# Patient Record
Sex: FEMALE | Race: WHITE | NOT HISPANIC OR LATINO | Employment: STUDENT | URBAN - METROPOLITAN AREA
[De-identification: names, ages, dates, MRNs, and addresses within clinical notes are randomized per-mention and may not be internally consistent; named-entity substitution may affect disease eponyms.]

---
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
## 2025-07-13 ENCOUNTER — HOSPITAL ENCOUNTER (OUTPATIENT)
Facility: HOSPITAL | Age: 20
Setting detail: OBSERVATION
Discharge: HOME | End: 2025-07-14
Attending: EMERGENCY MEDICINE | Admitting: EMERGENCY MEDICINE
Payer: COMMERCIAL

## 2025-07-13 DIAGNOSIS — R11.2 NAUSEA AND VOMITING, UNSPECIFIED VOMITING TYPE: Primary | ICD-10-CM

## 2025-07-13 DIAGNOSIS — R10.31 RIGHT LOWER QUADRANT ABDOMINAL PAIN: ICD-10-CM

## 2025-07-13 PROBLEM — R11.10 VOMITING: Status: ACTIVE | Noted: 2025-07-13

## 2025-07-13 LAB
ANION GAP SERPL CALCULATED.3IONS-SCNC: 15.8 MMOL/L (ref 5–15)
BACTERIA UR QL AUTO: ABNORMAL /HPF
BASOPHILS # BLD AUTO: 0.07 10*3/MM3 (ref 0–0.2)
BASOPHILS NFR BLD AUTO: 0.5 % (ref 0–1.5)
BILIRUB UR QL STRIP: ABNORMAL
BUN SERPL-MCNC: 14.5 MG/DL (ref 6–20)
BUN/CREAT SERPL: 22.3 (ref 7–25)
CALCIUM SPEC-SCNC: 9.8 MG/DL (ref 8.6–10.5)
CHLORIDE SERPL-SCNC: 99 MMOL/L (ref 98–107)
CLARITY UR: ABNORMAL
CO2 SERPL-SCNC: 22.2 MMOL/L (ref 22–29)
COLOR UR: ABNORMAL
CREAT SERPL-MCNC: 0.65 MG/DL (ref 0.57–1)
DEPRECATED RDW RBC AUTO: 41.9 FL (ref 37–54)
EGFRCR SERPLBLD CKD-EPI 2021: 127.8 ML/MIN/1.73
EOSINOPHIL # BLD AUTO: 0 10*3/MM3 (ref 0–0.4)
EOSINOPHIL NFR BLD AUTO: 0 % (ref 0.3–6.2)
ERYTHROCYTE [DISTWIDTH] IN BLOOD BY AUTOMATED COUNT: 14.6 % (ref 12.3–15.4)
GLUCOSE SERPL-MCNC: 116 MG/DL (ref 65–99)
GLUCOSE UR STRIP-MCNC: NEGATIVE MG/DL
HCT VFR BLD AUTO: 45.4 % (ref 34–46.6)
HGB BLD-MCNC: 13.7 G/DL (ref 12–15.9)
HGB UR QL STRIP.AUTO: ABNORMAL
HOLD SPECIMEN: NORMAL
HYALINE CASTS UR QL AUTO: ABNORMAL /LPF
IMM GRANULOCYTES # BLD AUTO: 0.15 10*3/MM3 (ref 0–0.05)
IMM GRANULOCYTES NFR BLD AUTO: 1.1 % (ref 0–0.5)
KETONES UR QL STRIP: ABNORMAL
LEUKOCYTE ESTERASE UR QL STRIP.AUTO: ABNORMAL
LYMPHOCYTES # BLD AUTO: 2.22 10*3/MM3 (ref 0.7–3.1)
LYMPHOCYTES NFR BLD AUTO: 16.7 % (ref 19.6–45.3)
MCH RBC QN AUTO: 23.9 PG (ref 26.6–33)
MCHC RBC AUTO-ENTMCNC: 30.2 G/DL (ref 31.5–35.7)
MCV RBC AUTO: 79.2 FL (ref 79–97)
MONOCYTES # BLD AUTO: 1.13 10*3/MM3 (ref 0.1–0.9)
MONOCYTES NFR BLD AUTO: 8.5 % (ref 5–12)
MUCOUS THREADS URNS QL MICRO: ABNORMAL /HPF
NEUTROPHILS NFR BLD AUTO: 73.2 % (ref 42.7–76)
NEUTROPHILS NFR BLD AUTO: 9.7 10*3/MM3 (ref 1.7–7)
NITRITE UR QL STRIP: NEGATIVE
NRBC BLD AUTO-RTO: 0 /100 WBC (ref 0–0.2)
PH UR STRIP.AUTO: 6 [PH] (ref 5–8)
PLATELET # BLD AUTO: 583 10*3/MM3 (ref 140–450)
PMV BLD AUTO: 9.4 FL (ref 6–12)
POTASSIUM SERPL-SCNC: 4.2 MMOL/L (ref 3.5–5.2)
PROT UR QL STRIP: ABNORMAL
RBC # BLD AUTO: 5.73 10*6/MM3 (ref 3.77–5.28)
RBC # UR STRIP: ABNORMAL /HPF
REF LAB TEST METHOD: ABNORMAL
SODIUM SERPL-SCNC: 137 MMOL/L (ref 136–145)
SP GR UR STRIP: 1.03 (ref 1–1.03)
SQUAMOUS #/AREA URNS HPF: ABNORMAL /HPF
UROBILINOGEN UR QL STRIP: ABNORMAL
WBC # UR STRIP: ABNORMAL /HPF
WBC NRBC COR # BLD AUTO: 13.27 10*3/MM3 (ref 3.4–10.8)
WHOLE BLOOD HOLD COAG: NORMAL

## 2025-07-13 PROCEDURE — 25010000002 PROCHLORPERAZINE 10 MG/2ML SOLUTION: Performed by: NURSE PRACTITIONER

## 2025-07-13 PROCEDURE — 80048 BASIC METABOLIC PNL TOTAL CA: CPT | Performed by: NURSE PRACTITIONER

## 2025-07-13 PROCEDURE — 96376 TX/PRO/DX INJ SAME DRUG ADON: CPT

## 2025-07-13 PROCEDURE — 96375 TX/PRO/DX INJ NEW DRUG ADDON: CPT

## 2025-07-13 PROCEDURE — 99285 EMERGENCY DEPT VISIT HI MDM: CPT

## 2025-07-13 PROCEDURE — 25010000002 MORPHINE PER 10 MG: Performed by: EMERGENCY MEDICINE

## 2025-07-13 PROCEDURE — 96374 THER/PROPH/DIAG INJ IV PUSH: CPT

## 2025-07-13 PROCEDURE — 85025 COMPLETE CBC W/AUTO DIFF WBC: CPT | Performed by: NURSE PRACTITIONER

## 2025-07-13 PROCEDURE — G0378 HOSPITAL OBSERVATION PER HR: HCPCS

## 2025-07-13 PROCEDURE — 81001 URINALYSIS AUTO W/SCOPE: CPT | Performed by: NURSE PRACTITIONER

## 2025-07-13 PROCEDURE — 25010000002 ONDANSETRON PER 1 MG: Performed by: EMERGENCY MEDICINE

## 2025-07-13 PROCEDURE — 25010000002 ONDANSETRON PER 1 MG: Performed by: NURSE PRACTITIONER

## 2025-07-13 PROCEDURE — 25010000002 PROCHLORPERAZINE 10 MG/2ML SOLUTION: Performed by: EMERGENCY MEDICINE

## 2025-07-13 PROCEDURE — 25810000003 SODIUM CHLORIDE 0.9 % SOLUTION: Performed by: NURSE PRACTITIONER

## 2025-07-13 RX ORDER — ONDANSETRON 2 MG/ML
4 INJECTION INTRAMUSCULAR; INTRAVENOUS ONCE
Status: COMPLETED | OUTPATIENT
Start: 2025-07-13 | End: 2025-07-13

## 2025-07-13 RX ORDER — PROCHLORPERAZINE EDISYLATE 5 MG/ML
10 INJECTION INTRAMUSCULAR; INTRAVENOUS EVERY 8 HOURS PRN
Status: DISCONTINUED | OUTPATIENT
Start: 2025-07-13 | End: 2025-07-14 | Stop reason: HOSPADM

## 2025-07-13 RX ORDER — ONDANSETRON 4 MG/1
4 TABLET, ORALLY DISINTEGRATING ORAL 4 TIMES DAILY
COMMUNITY

## 2025-07-13 RX ORDER — ACETAMINOPHEN 160 MG/5ML
650 SOLUTION ORAL EVERY 4 HOURS PRN
Status: DISCONTINUED | OUTPATIENT
Start: 2025-07-13 | End: 2025-07-14 | Stop reason: HOSPADM

## 2025-07-13 RX ORDER — SODIUM CHLORIDE 9 MG/ML
40 INJECTION, SOLUTION INTRAVENOUS AS NEEDED
Status: DISCONTINUED | OUTPATIENT
Start: 2025-07-13 | End: 2025-07-14 | Stop reason: HOSPADM

## 2025-07-13 RX ORDER — BISACODYL 5 MG/1
5 TABLET, DELAYED RELEASE ORAL DAILY PRN
Status: DISCONTINUED | OUTPATIENT
Start: 2025-07-13 | End: 2025-07-14 | Stop reason: HOSPADM

## 2025-07-13 RX ORDER — PROMETHAZINE HYDROCHLORIDE 25 MG/1
25 SUPPOSITORY RECTAL EVERY 6 HOURS PRN
Status: DISCONTINUED | OUTPATIENT
Start: 2025-07-13 | End: 2025-07-14 | Stop reason: HOSPADM

## 2025-07-13 RX ORDER — ONDANSETRON 2 MG/ML
4 INJECTION INTRAMUSCULAR; INTRAVENOUS EVERY 6 HOURS PRN
Status: DISCONTINUED | OUTPATIENT
Start: 2025-07-13 | End: 2025-07-14 | Stop reason: HOSPADM

## 2025-07-13 RX ORDER — MORPHINE SULFATE 2 MG/ML
2 INJECTION, SOLUTION INTRAMUSCULAR; INTRAVENOUS EVERY 4 HOURS PRN
Status: DISCONTINUED | OUTPATIENT
Start: 2025-07-13 | End: 2025-07-14 | Stop reason: HOSPADM

## 2025-07-13 RX ORDER — AMOXICILLIN 250 MG
2 CAPSULE ORAL 2 TIMES DAILY PRN
Status: DISCONTINUED | OUTPATIENT
Start: 2025-07-13 | End: 2025-07-14 | Stop reason: HOSPADM

## 2025-07-13 RX ORDER — SODIUM CHLORIDE 0.9 % (FLUSH) 0.9 %
10 SYRINGE (ML) INJECTION AS NEEDED
Status: DISCONTINUED | OUTPATIENT
Start: 2025-07-13 | End: 2025-07-14 | Stop reason: HOSPADM

## 2025-07-13 RX ORDER — PROCHLORPERAZINE EDISYLATE 5 MG/ML
5 INJECTION INTRAMUSCULAR; INTRAVENOUS ONCE
Status: COMPLETED | OUTPATIENT
Start: 2025-07-13 | End: 2025-07-13

## 2025-07-13 RX ORDER — ACETAMINOPHEN 650 MG/1
650 SUPPOSITORY RECTAL EVERY 4 HOURS PRN
Status: DISCONTINUED | OUTPATIENT
Start: 2025-07-13 | End: 2025-07-14 | Stop reason: HOSPADM

## 2025-07-13 RX ORDER — BISACODYL 10 MG
10 SUPPOSITORY, RECTAL RECTAL DAILY PRN
Status: DISCONTINUED | OUTPATIENT
Start: 2025-07-13 | End: 2025-07-14 | Stop reason: HOSPADM

## 2025-07-13 RX ORDER — ACETAMINOPHEN 325 MG/1
650 TABLET ORAL EVERY 4 HOURS PRN
Status: DISCONTINUED | OUTPATIENT
Start: 2025-07-13 | End: 2025-07-14 | Stop reason: HOSPADM

## 2025-07-13 RX ORDER — POLYETHYLENE GLYCOL 3350 17 G/17G
17 POWDER, FOR SOLUTION ORAL DAILY PRN
Status: DISCONTINUED | OUTPATIENT
Start: 2025-07-13 | End: 2025-07-14 | Stop reason: HOSPADM

## 2025-07-13 RX ORDER — SODIUM CHLORIDE 0.9 % (FLUSH) 0.9 %
10 SYRINGE (ML) INJECTION EVERY 12 HOURS SCHEDULED
Status: DISCONTINUED | OUTPATIENT
Start: 2025-07-13 | End: 2025-07-14 | Stop reason: HOSPADM

## 2025-07-13 RX ORDER — NALOXONE HCL 0.4 MG/ML
0.4 VIAL (ML) INJECTION
Status: DISCONTINUED | OUTPATIENT
Start: 2025-07-13 | End: 2025-07-14 | Stop reason: HOSPADM

## 2025-07-13 RX ADMIN — ONDANSETRON 4 MG: 2 INJECTION, SOLUTION INTRAMUSCULAR; INTRAVENOUS at 14:23

## 2025-07-13 RX ADMIN — Medication 10 ML: at 20:55

## 2025-07-13 RX ADMIN — MORPHINE SULFATE 2 MG: 2 INJECTION, SOLUTION INTRAMUSCULAR; INTRAVENOUS at 22:42

## 2025-07-13 RX ADMIN — Medication 10 ML: at 20:54

## 2025-07-13 RX ADMIN — SODIUM CHLORIDE 1000 ML: 9 INJECTION, SOLUTION INTRAVENOUS at 14:23

## 2025-07-13 RX ADMIN — PROCHLORPERAZINE EDISYLATE 10 MG: 5 INJECTION INTRAMUSCULAR; INTRAVENOUS at 23:01

## 2025-07-13 RX ADMIN — ONDANSETRON 4 MG: 2 INJECTION, SOLUTION INTRAMUSCULAR; INTRAVENOUS at 22:42

## 2025-07-13 RX ADMIN — PROCHLORPERAZINE EDISYLATE 5 MG: 5 INJECTION INTRAMUSCULAR; INTRAVENOUS at 16:05

## 2025-07-13 NOTE — PLAN OF CARE
Goal Outcome Evaluation:      Patient admitted to room with parents at bedside. Room air, abdominal pain 1/10. C/O nausea and vomiting. Patient oriented to room and safety.

## 2025-07-13 NOTE — ED NOTES
Nursing report ED to floor  Minerva Cuevas  20 y.o.  female    HPI:   Chief Complaint   Patient presents with    Abdominal Pain     C/o RLQ abdominal pain with n/v for past 5 days. Pt was seen at [place] ED for this and dx with inflammation to abdominal lymph nodes and n/v. Pt reports continued sx.       Admitting doctor:   Chris Jean MD    Admitting diagnosis:   The primary encounter diagnosis was Nausea and vomiting, unspecified vomiting type. A diagnosis of Right lower quadrant abdominal pain was also pertinent to this visit.    Code status:   Current Code Status       Date Active Code Status Order ID Comments User Context       Not on file            Allergies:   Patient has no known allergies.    Isolation:  No active isolations     Fall Risk:  Fall Risk Assessment was completed, and patient is at low risk for falls.   Predictive Model Details         2 (Low) Factor Value    Calculated 7/13/2025 17:12 Active Peripheral IV Present    Risk of Fall Model Respiratory Rate 18     Magnesium not on file     Number of Distinct Medication Classes administered 3     Damion Scale not on file     Age 20     Chloride 99 mmol/L     Albumin not on file     Gastrointestinal Assessment X     Diastolic      Creatinine 0.65 mg/dL     Total Bilirubin not on file     Duration of Current Encounter 0.157 days     Potassium 4.2 mmol/L     Calcium 9.8 mg/dL     ALT not on file         Weight:       07/13/25  1310   Weight: 112 kg (247 lb 12.8 oz)       Intake and Output    Intake/Output Summary (Last 24 hours) at 7/13/2025 1713  Last data filed at 7/13/2025 1700  Gross per 24 hour   Intake 1000 ml   Output --   Net 1000 ml       Diet:        Most recent vitals:   Vitals:    07/13/25 1333 07/13/25 1352 07/13/25 1453 07/13/25 1701   BP: 138/89 154/81 (!) 186/86 179/100   BP Location:       Patient Position:       Pulse: 95 69 61 71   Resp:       Temp:       TempSrc:       SpO2: 97% 98% 97% 97%   Weight:       Height:            Active LDAs/IV Access:   Lines, Drains & Airways       Active LDAs       Name Placement date Placement time Site Days    Peripheral IV 07/13/25 1423 20 G Anterior;Left Forearm 07/13/25 1423  Forearm  less than 1                    Skin Condition:   Skin Assessments (last day)       None             Labs (abnormal labs have a star):   Labs Reviewed   BASIC METABOLIC PANEL - Abnormal; Notable for the following components:       Result Value    Glucose 116 (*)     Anion Gap 15.8 (*)     All other components within normal limits    Narrative:     GFR Categories in Chronic Kidney Disease (CKD)              GFR Category          GFR (mL/min/1.73)    Interpretation  G1                    90 or greater        Normal or high (1)  G2                    60-89                Mild decrease (1)  G3a                   45-59                Mild to moderate decrease  G3b                   30-44                Moderate to severe decrease  G4                    15-29                Severe decrease  G5                    14 or less           Kidney failure    (1)In the absence of evidence of kidney disease, neither GFR category G1 or G2 fulfill the criteria for CKD.    eGFR calculation 2021 CKD-EPI creatinine equation, which does not include race as a factor   URINALYSIS W/ MICROSCOPIC IF INDICATED (NO CULTURE) - Abnormal; Notable for the following components:    Color, UA Dark Yellow (*)     Appearance, UA Cloudy (*)     Specific Gravity, UA 1.032 (*)     Ketones, UA 40 mg/dL (2+) (*)     Bilirubin, UA Small (1+) (*)     Blood, UA Large (3+) (*)     Protein, UA 30 mg/dL (1+) (*)     Leuk Esterase, UA Trace (*)     All other components within normal limits   CBC WITH AUTO DIFFERENTIAL - Abnormal; Notable for the following components:    WBC 13.27 (*)     RBC 5.73 (*)     MCH 23.9 (*)     MCHC 30.2 (*)     Platelets 583 (*)     Lymphocyte % 16.7 (*)     Eosinophil % 0.0 (*)     Immature Grans % 1.1 (*)     Neutrophils, Absolute  9.70 (*)     Monocytes, Absolute 1.13 (*)     Immature Grans, Absolute 0.15 (*)     All other components within normal limits   URINALYSIS, MICROSCOPIC ONLY - Abnormal; Notable for the following components:    WBC, UA 3-5 (*)     Squamous Epithelial Cells, UA 3-6 (*)     Mucus, UA Large/3+ (*)     All other components within normal limits   CBC AND DIFFERENTIAL    Narrative:     The following orders were created for panel order CBC & Differential.  Procedure                               Abnormality         Status                     ---------                               -----------         ------                     CBC Auto Differential[[id number]]        Abnormal            Final result                 Please view results for these tests on the individual orders.   EXTRA TUBES    Narrative:     The following orders were created for panel order Extra Tubes.  Procedure                               Abnormality         Status                     ---------                               -----------         ------                     Gold Top - [facility][[id number]]                                   Final result               Light Blue Top[[id number]]                                   Final result                 Please view results for these tests on the individual orders.   GOLD [facility] - [facility]   LIGHT BLUE TOP       LOC: Person, Place, Time, and Situation    Telemetry:  Observation Unit    Cardiac Monitoring Ordered: yes    EKG:   No orders to display       Medications Given in the ED:   Medications   sodium chloride 0.9 % bolus 1,000 mL (0 mL Intravenous Stopped 7/13/25 1700)   ondansetron (ZOFRAN) injection 4 mg (4 mg Intravenous Given 7/13/25 1423)   prochlorperazine (COMPAZINE) injection 5 mg (5 mg Intravenous Given 7/13/25 1605)       Imaging results:  No radiology results for the last day    Social issues:   Social History     Socioeconomic History    Marital status: Single   Tobacco Use    Smoking status: Never     Smokeless tobacco: Never   Substance and Sexual Activity    Alcohol use: No    Drug use: No    Sexual activity: Defer       NIH Stroke Scale:  Interval: (not recorded)  1a. Level of Consciousness: (not recorded)  1b. LOC Questions: (not recorded)  1c. LOC Commands: (not recorded)  2. Best Gaze: (not recorded)  3. Visual: (not recorded)  4. Facial Palsy: (not recorded)  5a. Motor Arm, Left: (not recorded)  5b. Motor Arm, Right: (not recorded)  6a. Motor Leg, Left: (not recorded)  6b. Motor Leg, Right: (not recorded)  7. Limb Ataxia: (not recorded)  8. Sensory: (not recorded)  9. Best Language: (not recorded)  10. Dysarthria: (not recorded)  11. Extinction and Inattention (formerly Neglect): (not recorded)    Total (NIH Stroke Scale): (not recorded)     Additional notable assessment information:     Nursing report ED to floor:  REPORT GIVEN TO EMANUEL DUONG TAKEN ROOM 223    Diana Troy LPN   07/13/25 17:13 EDT

## 2025-07-13 NOTE — ED PROVIDER NOTES
Subjective   Chief Complaint   Patient presents with    Abdominal Pain     C/o RLQ abdominal pain with n/v for past 5 days. Pt was seen at [place] ED for this and dx with inflammation to abdominal lymph nodes and n/v. Pt reports continued sx.       History of Present Illness  Patient is a 20-year-old female presents the ED with complaint of generalized abdominal pain, nausea, vomiting, diarrhea.  Symptoms began about 6 days ago.  Denies a fever.  Denies any hematochezia or melena.  Reports being seen at outlying facility on 2 occasions for similar, but reports no resolution of symptoms.  Denies urinary symptoms.  No recent travel.  No ill contacts.  No suspicious foods    Review of Systems    Past Medical History:   Diagnosis Date    Allergic     Allergic rhinitis due to pollen     Asthma, moderate persistent        No Known Allergies    No past surgical history on file.    Family History   Problem Relation Age of Onset    Breast cancer Maternal Grandmother        Social History     Socioeconomic History    Marital status: Single   Tobacco Use    Smoking status: Never    Smokeless tobacco: Never   Substance and Sexual Activity    Alcohol use: No    Drug use: No    Sexual activity: Defer           Objective   Physical Exam  Vitals and nursing note reviewed.   Constitutional:       Appearance: She is well-developed. She is not toxic-appearing.   HENT:      Head: Normocephalic and atraumatic.      Mouth/Throat:      Mouth: Mucous membranes are moist.      Pharynx: Oropharynx is clear.   Eyes:      Extraocular Movements: Extraocular movements intact.      Pupils: Pupils are equal, round, and reactive to light.   Cardiovascular:      Rate and Rhythm: Normal rate and regular rhythm.   Pulmonary:      Effort: Pulmonary effort is normal.      Breath sounds: Normal breath sounds.   Abdominal:      General: Abdomen is flat. There is no distension. There are no signs of injury.      Palpations: Abdomen is soft.       Tenderness: There is no abdominal tenderness. There is no guarding or rebound.   Skin:     General: Skin is warm and dry.      Capillary Refill: Capillary refill takes less than 2 seconds.   Neurological:      General: No focal deficit present.      Mental Status: She is alert and oriented to person, place, and time.         Procedures           ED Course  ED Course as of 07/13/25 1641   Sun Jul 13, 2025   1622 Patient family updated. [LB]      ED Course User Index  [LB] Matilde Duran Freeman, ABRIL        No radiology results for the last day                                                 Medical Decision Making  Problems Addressed:  Nausea and vomiting, unspecified vomiting type: complicated acute illness or injury  Right lower quadrant abdominal pain: complicated acute illness or injury    Amount and/or Complexity of Data Reviewed  Labs: ordered.    Risk  Prescription drug management.  Decision regarding hospitalization.    Appropriate PPE worn during patient interactions.    Chart Review: CT abdomen pelvis [facility] shows mesenteric adenitis.  Diverticulosis without diverticulitis.  Hepatic cytosis    Differential diagnoses considered for patient chief complaint and presentation, this list is not all inclusive of diagnoses considered: Ureterolithiasis, pyelonephritis, zoster, mesenteric adenitis, appendicitis.  Patient had the above exam and workup.  Patient was given IV fluids, antiemetics.Leukocytosis 13.2.  No signs of infection in the urine.  BMP reviewed.  Anion gap 15.  CO2 22.2.  Patient did have further vomiting despite Zofran, she was given Compazine.  Patient had CT scan yesterday, if not for the need to repeat today.  Please patient ED observation tonight with continued antiemetics, IV fluids.  If she is not better tomorrow, may consider abdominal ultrasound or GI consult.    Assessment findings and plan of care with the patient and her family, they agree.    Note Disclaimer: At  [facility], we believe that sharing information builds trust and better relationships. You are receiving this note because you recently visited [facility]. It is possible you will see health information before a provider has talked with you about it. This kind of information can be easy to misunderstand. To help you fully understand what it means for your health, we urge you to discuss this note with your provider  Note dictated utilizing Dragon Dictation.          Final diagnoses:   Nausea and vomiting, unspecified vomiting type   Right lower quadrant abdominal pain       ED Disposition  ED Disposition       ED Disposition   Decision to Admit    Condition   --    Comment   --               No follow-up provider specified.       Medication List      No changes were made to your prescriptions during this visit.            Matilde Duran, APRN  07/13/25 [id number]

## 2025-07-14 VITALS
HEIGHT: 64 IN | DIASTOLIC BLOOD PRESSURE: 92 MMHG | WEIGHT: 247.8 LBS | OXYGEN SATURATION: 96 % | TEMPERATURE: 98.1 F | BODY MASS INDEX: 42.3 KG/M2 | SYSTOLIC BLOOD PRESSURE: 159 MMHG | HEART RATE: 87 BPM | RESPIRATION RATE: 16 BRPM

## 2025-07-14 PROBLEM — R03.0 ELEVATED BLOOD PRESSURE READING: Status: ACTIVE | Noted: 2025-07-14

## 2025-07-14 PROBLEM — I88.0 MESENTERIC ADENITIS: Status: ACTIVE | Noted: 2025-07-14

## 2025-07-14 LAB
ANION GAP SERPL CALCULATED.3IONS-SCNC: 11.8 MMOL/L (ref 5–15)
BASOPHILS # BLD AUTO: 0.04 10*3/MM3 (ref 0–0.2)
BASOPHILS NFR BLD AUTO: 0.4 % (ref 0–1.5)
BUN SERPL-MCNC: 14 MG/DL (ref 6–20)
BUN/CREAT SERPL: 25.9 (ref 7–25)
CALCIUM SPEC-SCNC: 9.1 MG/DL (ref 8.6–10.5)
CHLORIDE SERPL-SCNC: 100 MMOL/L (ref 98–107)
CHOLEST SERPL-MCNC: 90 MG/DL (ref 0–200)
CO2 SERPL-SCNC: 25.2 MMOL/L (ref 22–29)
CREAT SERPL-MCNC: 0.54 MG/DL (ref 0.57–1)
DEPRECATED RDW RBC AUTO: 42.3 FL (ref 37–54)
EGFRCR SERPLBLD CKD-EPI 2021: 133.7 ML/MIN/1.73
EOSINOPHIL # BLD AUTO: 0 10*3/MM3 (ref 0–0.4)
EOSINOPHIL NFR BLD AUTO: 0 % (ref 0.3–6.2)
ERYTHROCYTE [DISTWIDTH] IN BLOOD BY AUTOMATED COUNT: 14.9 % (ref 12.3–15.4)
GLUCOSE SERPL-MCNC: 103 MG/DL (ref 65–99)
HCT VFR BLD AUTO: 41.7 % (ref 34–46.6)
HDLC SERPL-MCNC: 31 MG/DL (ref 40–60)
HGB BLD-MCNC: 12.4 G/DL (ref 12–15.9)
IMM GRANULOCYTES # BLD AUTO: 0.1 10*3/MM3 (ref 0–0.05)
IMM GRANULOCYTES NFR BLD AUTO: 0.9 % (ref 0–0.5)
LDLC SERPL CALC-MCNC: 42 MG/DL (ref 0–100)
LDLC/HDLC SERPL: 1.39 {RATIO}
LYMPHOCYTES # BLD AUTO: 2.97 10*3/MM3 (ref 0.7–3.1)
LYMPHOCYTES NFR BLD AUTO: 28 % (ref 19.6–45.3)
MCH RBC QN AUTO: 23.5 PG (ref 26.6–33)
MCHC RBC AUTO-ENTMCNC: 29.7 G/DL (ref 31.5–35.7)
MCV RBC AUTO: 79 FL (ref 79–97)
MONOCYTES # BLD AUTO: 0.88 10*3/MM3 (ref 0.1–0.9)
MONOCYTES NFR BLD AUTO: 8.3 % (ref 5–12)
NEUTROPHILS NFR BLD AUTO: 6.62 10*3/MM3 (ref 1.7–7)
NEUTROPHILS NFR BLD AUTO: 62.4 % (ref 42.7–76)
NRBC BLD AUTO-RTO: 0 /100 WBC (ref 0–0.2)
PLATELET # BLD AUTO: 492 10*3/MM3 (ref 140–450)
PMV BLD AUTO: 9.3 FL (ref 6–12)
POTASSIUM SERPL-SCNC: 3.5 MMOL/L (ref 3.5–5.2)
RBC # BLD AUTO: 5.28 10*6/MM3 (ref 3.77–5.28)
SODIUM SERPL-SCNC: 137 MMOL/L (ref 136–145)
TRIGL SERPL-MCNC: 80 MG/DL (ref 0–150)
VLDLC SERPL-MCNC: 17 MG/DL (ref 5–40)
WBC NRBC COR # BLD AUTO: 10.61 10*3/MM3 (ref 3.4–10.8)

## 2025-07-14 PROCEDURE — 96376 TX/PRO/DX INJ SAME DRUG ADON: CPT

## 2025-07-14 PROCEDURE — G0378 HOSPITAL OBSERVATION PER HR: HCPCS

## 2025-07-14 PROCEDURE — 25010000002 PROCHLORPERAZINE 10 MG/2ML SOLUTION: Performed by: EMERGENCY MEDICINE

## 2025-07-14 PROCEDURE — 85025 COMPLETE CBC W/AUTO DIFF WBC: CPT | Performed by: EMERGENCY MEDICINE

## 2025-07-14 PROCEDURE — 80061 LIPID PANEL: CPT | Performed by: NURSE PRACTITIONER

## 2025-07-14 PROCEDURE — 97161 PT EVAL LOW COMPLEX 20 MIN: CPT

## 2025-07-14 PROCEDURE — 80048 BASIC METABOLIC PNL TOTAL CA: CPT | Performed by: EMERGENCY MEDICINE

## 2025-07-14 RX ORDER — SCOPOLAMINE 1 MG/3D
1 PATCH, EXTENDED RELEASE TRANSDERMAL
Status: DISCONTINUED | OUTPATIENT
Start: 2025-07-14 | End: 2025-07-14 | Stop reason: HOSPADM

## 2025-07-14 RX ORDER — AMLODIPINE BESYLATE 2.5 MG/1
2.5 TABLET ORAL DAILY
Qty: 30 TABLET | Refills: 0 | Status: SHIPPED | OUTPATIENT
Start: 2025-07-14

## 2025-07-14 RX ORDER — POTASSIUM CHLORIDE 1500 MG/1
40 TABLET, EXTENDED RELEASE ORAL EVERY 4 HOURS
Status: COMPLETED | OUTPATIENT
Start: 2025-07-14 | End: 2025-07-14

## 2025-07-14 RX ORDER — PROCHLORPERAZINE MALEATE 5 MG/1
5 TABLET ORAL EVERY 6 HOURS PRN
Qty: 15 TABLET | Refills: 0 | Status: SHIPPED | OUTPATIENT
Start: 2025-07-14

## 2025-07-14 RX ORDER — AMLODIPINE BESYLATE 2.5 MG/1
2.5 TABLET ORAL ONCE
Status: COMPLETED | OUTPATIENT
Start: 2025-07-14 | End: 2025-07-14

## 2025-07-14 RX ORDER — PROCHLORPERAZINE EDISYLATE 5 MG/ML
5 INJECTION INTRAMUSCULAR; INTRAVENOUS ONCE
Status: DISCONTINUED | OUTPATIENT
Start: 2025-07-14 | End: 2025-07-14 | Stop reason: HOSPADM

## 2025-07-14 RX ORDER — SCOPOLAMINE 1 MG/3D
1 PATCH, EXTENDED RELEASE TRANSDERMAL
Qty: 4 EACH | Refills: 0 | Status: SHIPPED | OUTPATIENT
Start: 2025-07-14

## 2025-07-14 RX ADMIN — PROCHLORPERAZINE EDISYLATE 10 MG: 5 INJECTION INTRAMUSCULAR; INTRAVENOUS at 07:30

## 2025-07-14 RX ADMIN — Medication 10 ML: at 07:30

## 2025-07-14 RX ADMIN — ACETAMINOPHEN 650 MG: 325 TABLET, FILM COATED ORAL at 08:10

## 2025-07-14 RX ADMIN — POTASSIUM CHLORIDE 40 MEQ: 1500 TABLET, EXTENDED RELEASE ORAL at 10:05

## 2025-07-14 RX ADMIN — SCOPOLAMINE 1 PATCH: 1.5 PATCH, EXTENDED RELEASE TRANSDERMAL at 08:10

## 2025-07-14 RX ADMIN — AMLODIPINE BESYLATE 2.5 MG: 2.5 TABLET ORAL at 12:13

## 2025-07-14 RX ADMIN — POTASSIUM CHLORIDE 40 MEQ: 1500 TABLET, EXTENDED RELEASE ORAL at 06:05

## 2025-07-14 NOTE — PLAN OF CARE
Problem: Adult Inpatient Plan of Care  Goal: Absence of Hospital-Acquired Illness or Injury  Intervention: Identify and Manage Fall Risk  Recent Flowsheet Documentation  Taken 7/14/2025 0400 by Kyra Rodrigues RN  Safety Promotion/Fall Prevention: safety round/check completed  Taken 7/14/2025 0200 by Kyra Rodrigues RN  Safety Promotion/Fall Prevention: safety round/check completed  Taken 7/14/2025 0000 by Kyra Rodrigues RN  Safety Promotion/Fall Prevention: safety round/check completed  Taken 7/13/2025 2200 by Kyra Rodrigues RN  Safety Promotion/Fall Prevention: safety round/check completed  Taken 7/13/2025 2000 by Kyra Rodrigues RN  Safety Promotion/Fall Prevention: safety round/check completed  Intervention: Prevent Skin Injury  Recent Flowsheet Documentation  Taken 7/13/2025 2000 by Kyra Rodrigues RN  Body Position: position changed independently  Skin Protection: transparent dressing maintained  Intervention: Prevent Infection  Recent Flowsheet Documentation  Taken 7/13/2025 2000 by Kyra Rodrigues RN  Infection Prevention:   single patient room provided   rest/sleep promoted  Goal: Optimal Comfort and Wellbeing  Intervention: Provide Person-Centered Care  Recent Flowsheet Documentation  Taken 7/13/2025 2000 by Kyra Rodrigues RN  Trust Relationship/Rapport:   care explained   choices provided   emotional support provided   empathic listening provided   questions answered   questions encouraged   reassurance provided   thoughts/feelings acknowledged  Goal: Absence of Hospital-Acquired Illness or Injury  Intervention: Identify and Manage Fall Risk  Recent Flowsheet Documentation  Taken 7/14/2025 0400 by Kyra Rodrigues RN  Safety Promotion/Fall Prevention: safety round/check completed  Taken 7/14/2025 0200 by Kyra Rodrigues RN  Safety Promotion/Fall Prevention: safety round/check completed  Taken 7/14/2025 0000 by Kyra Rodrigues RN  Safety Promotion/Fall Prevention: safety  round/check completed  Taken 7/13/2025 2200 by Kyra Rodrigues RN  Safety Promotion/Fall Prevention: safety round/check completed  Taken 7/13/2025 2000 by Kyra Rodrigues RN  Safety Promotion/Fall Prevention: safety round/check completed  Intervention: Prevent Skin Injury  Recent Flowsheet Documentation  Taken 7/13/2025 2000 by Kyra Rodrigues RN  Body Position: position changed independently  Skin Protection: transparent dressing maintained  Intervention: Prevent Infection  Recent Flowsheet Documentation  Taken 7/13/2025 2000 by Kyra Rodrigues RN  Infection Prevention:   single patient room provided   rest/sleep promoted  Goal: Optimal Comfort and Wellbeing  Intervention: Provide Person-Centered Care  Recent Flowsheet Documentation  Taken 7/13/2025 2000 by Kyra Rodrigues RN  Trust Relationship/Rapport:   care explained   choices provided   emotional support provided   empathic listening provided   questions answered   questions encouraged   reassurance provided   thoughts/feelings acknowledged     Problem: Nausea and Vomiting  Goal: Nausea and Vomiting Relief  Intervention: Prevent and Manage Nausea and Vomiting  Recent Flowsheet Documentation  Taken 7/13/2025 2312 by Kyra Rodrigues RN  Nausea/Vomiting Interventions: medication given  Taken 7/13/2025 2000 by Kyra Rodrigues RN  Fluid/Electrolyte Management: intravenous fluid replacement initiated  Nausea/Vomiting Interventions:   sips of clear liquids given   nausea triggers minimized  Oral Care: patient refused intervention   Goal Outcome Evaluation:

## 2025-07-14 NOTE — PLAN OF CARE
Goal Outcome Evaluation:  Plan of Care Reviewed With: patient, parent           Outcome Evaluation: 21 y/o F with h/o generalized abdominal pain, n/v, diarrhea seen at outlying facility on two similar occasions without resolution of symptoms. Pt was seen at [place] ED for this and dx with inflammation to abdominal lymph nodes and n/v. At baseline, pt lives at home with family and is I with functional mobility/ ADLs without AD, was I in community, and was driving. Pt/mother and nursing agreeable to PT eval this date. Vitals WNL. Pt at I level with bed mobility, functional transfers, gait training x 150 ft without AD, and stair training with no safety concerns observed. PT consulted for R hip pain, however pt unable to reproduce during PT session reporting no pain at rest/during activity this date with no impact on functional performance. PT d/c recommendation of returning home with support from family as needed. Pt reports has been up ad asa within room and is at functional baseline with no further skilled PT services warranted. PT signing off.    Anticipated Discharge Disposition (PT): home

## 2025-07-14 NOTE — PLAN OF CARE
Goal Outcome Evaluation:    Tolerated CLD; diet advanced to full. Oral electrolyte replacement followed per protocol. Oral meds given as needed for c/o HA. IV meds given as needed for nausea control; scopolamine patch applied behind R ear.

## 2025-07-14 NOTE — DISCHARGE SUMMARY
[facility]    Provider, No Known    CHIEF COMPLAINT:     Abd pain      HISTORY OF PRESENT ILLNESS:    HPI    7/13/25 er note- Patient is a 20-year-old female presents the ED with complaint of generalized abdominal pain, nausea, vomiting, diarrhea.  Symptoms began about 6 days ago.  Denies a fever.  Denies any hematochezia or melena.  Reports being seen at outlying facility on 2 occasions for similar, but reports no resolution of symptoms.  Denies urinary symptoms.  No recent travel.  No ill contacts.  No suspicious foods    Obs note 7/14/25  Patient continues to complain of nausea without vomiting.  She was placed on a scopolamine patch.  Mother at bedside states Zofran does not work for her.  Of note patient states she has also had some right sided hip atraumatic pain without any febrile illness and PT was consulted to eval for SI joint dysfunction.  Advancing diet as tolerated with hopeful for discharge today.      Past Medical History:   Diagnosis Date    Allergic     Allergic rhinitis due to pollen     Asthma, moderate persistent      History reviewed. No pertinent surgical history.  Family History   Problem Relation Age of Onset    Breast cancer Maternal Grandmother      Social History     Tobacco Use    Smoking status: Never    Smokeless tobacco: Never   Vaping Use    Vaping status: Never Used   Substance Use Topics    Alcohol use: No    Drug use: Never     Medications Prior to Admission   Medication Sig Dispense Refill Last Dose/Taking    cetirizine (zyrTEC) 10 MG tablet Take 1 tablet by mouth Daily. 90 tablet 2 7/12/2025    ondansetron ODT (ZOFRAN-ODT) 4 MG disintegrating tablet Place 1 tablet on the tongue 4 (Four) Times a Day.   7/13/2025 Morning     Allergies:  Patient has no known allergies.    Immunization History   Administered Date(s) Administered    COVID-19 (PFIZER) Purple Cap Monovalent 05/25/2021, 06/22/2021, 12/29/2021    DTaP 03/12/2004, 08/19/2007, 02/04/2010    DTaP /  Hep B / IPV 03/12/2004, 09/19/2007, 02/04/2010    Flu Vaccine Quad PF 6-35MO 11/08/2005, 11/10/2009, 12/21/2009    Flu Vaccine Quad PF >36MO 11/08/2005, 12/21/2009, 11/27/2012    FluMist 2-49yrs (Nasal) 02/04/2010    Fluzone  >6mos 09/28/2011, 11/18/2015    Fluzone (or Fluarix & Flulaval for VFC) >6mos 10/19/2020    H1N1 Inj 11/10/2009, 12/21/2009    Hep A, 2 Dose 09/22/2010, 11/27/2012    Hep B, Adolescent or Pediatric 2003    Hepatitis B Adult/Adolescent IM 2003, 03/12/2004, 09/19/2007    HiB 03/12/2004, 09/19/2007    Hib (HbOC) 03/12/2004    Hib (PRP-OMP) 09/19/2007    Hpv9 11/18/2015, 08/01/2017    IPV 03/12/2004, 09/19/2007, 02/04/2010, 08/02/2010    MMR 09/19/2007, 02/04/2010    Meningococcal B,(Bexsero) 08/05/2021    Meningococcal MCV4P (Menactra) 11/27/2012, 08/05/2021    PEDS-Pneumococcal Conjugate (PCV7) 03/12/2004, 09/19/2007    Pneumococcal, Unspecified 03/12/2004, 09/19/2007    Td (TDVAX) 09/22/2010    Tdap 11/27/2012, 08/05/2021    Varicella 09/19/2007, 02/04/2010           REVIEW OF SYSTEMS:    Review of Systems   Constitutional: Negative for fever.   HENT:  Negative for congestion.    Cardiovascular:  Negative for chest pain.   Respiratory:  Negative for cough.    Musculoskeletal:  Positive for joint pain.   Gastrointestinal:  Positive for abdominal pain, nausea and vomiting.   Genitourinary:  Negative for dysuria.       Vital Signs  Temp:  [97.8 °F (36.6 °C)-98.6 °F (37 °C)] 98.1 °F (36.7 °C)  Heart Rate:  [61-95] 87  Resp:  [15-18] 16  BP: (126-186)/() 159/92          Physical Exam:  Physical Exam    Vital signs and triage nurse note reviewed.  Constitutional: Awake, alert; well-developed and well-nourished.  Nontoxic in appearance.  They are at bedside  HEENT: Normocephalic, atraumatic; with intact EOM; oropharynx is pink and moist without exudate or erythema.  Neck: Supple,   Cardiovascular: Regular rate and rhythm, normal S1-S2.  Pulmonary: Respiratory effort regular  nonlabored, breath sounds clear to auscultation all fields.  Abdomen: Soft, tender on the right lower quadrant without peritonitis nondistended with normoactive bowel sounds; no rebound or guarding.  Musculoskeletal: Independent range of motion of all extremities with no palpable tenderness or edema.  Neuro: Alert oriented x3, speech is clear and appropriate, GCS 15  Skin:  Fleshtone warm, dry, intact; no erythematous or petechial rash or lesion      Emotional Behavior:   Calm and cooperative   Debilities:  None  Results Review:    I reviewed the patient's new clinical results.  Lab Results (most recent)       Procedure Component Value Units Date/Time    Basic Metabolic Panel [[id number]]  (Abnormal) Collected: 07/14/25 0307    Specimen: Blood Updated: 07/14/25 0415     Glucose 103 mg/dL      BUN 14.0 mg/dL      Creatinine 0.54 mg/dL      Sodium 137 mmol/L      Potassium 3.5 mmol/L      Chloride 100 mmol/L      CO2 25.2 mmol/L      Calcium 9.1 mg/dL      BUN/Creatinine Ratio 25.9     Anion Gap 11.8 mmol/L      eGFR 133.7 mL/min/1.73     Narrative:      GFR Categories in Chronic Kidney Disease (CKD)              GFR Category          GFR (mL/min/1.73)    Interpretation  G1                    90 or greater        Normal or high (1)  G2                    60-89                Mild decrease (1)  G3a                   45-59                Mild to moderate decrease  G3b                   30-44                Moderate to severe decrease  G4                    15-29                Severe decrease  G5                    14 or less           Kidney failure    (1)In the absence of evidence of kidney disease, neither GFR category G1 or G2 fulfill the criteria for CKD.    eGFR calculation 2021 CKD-EPI creatinine equation, which does not include race as a factor    CBC Auto Differential [[id number]]  (Abnormal) Collected: 07/14/25 0307    Specimen: Blood Updated: 07/14/25 0349     WBC 10.61 10*3/mm3      RBC 5.28 10*6/mm3       Hemoglobin 12.4 g/dL      Hematocrit 41.7 %      MCV 79.0 fL      MCH 23.5 pg      MCHC 29.7 g/dL      RDW 14.9 %      RDW-SD 42.3 fl      MPV 9.3 fL      Platelets 492 10*3/mm3      Neutrophil % 62.4 %      Lymphocyte % 28.0 %      Monocyte % 8.3 %      Eosinophil % 0.0 %      Basophil % 0.4 %      Immature Grans % 0.9 %      Neutrophils, Absolute 6.62 10*3/mm3      Lymphocytes, Absolute 2.97 10*3/mm3      Monocytes, Absolute 0.88 10*3/mm3      Eosinophils, Absolute 0.00 10*3/mm3      Basophils, Absolute 0.04 10*3/mm3      Immature Grans, Absolute 0.10 10*3/mm3      nRBC 0.0 /100 WBC     Urinalysis, Microscopic Only - Urine, Clean Catch [[id number]]  (Abnormal) Collected: 07/13/25 1541    Specimen: Urine, Clean Catch Updated: 07/13/25 1615     RBC, UA 0-2 /HPF      WBC, UA 3-5 /HPF      Bacteria, UA None Seen /HPF      Squamous Epithelial Cells, UA 3-6 /HPF      Hyaline Casts, UA 3-6 /LPF      Mucus, UA Large/3+ /HPF      Methodology Manual Light Microscopy    Urinalysis With Microscopic If Indicated (No Culture) - Urine, Clean Catch [[id number]]  (Abnormal) Collected: 07/13/25 1541    Specimen: Urine, Clean Catch Updated: 07/13/25 1547     Color, UA Dark Yellow     Comment: Result checked          Appearance, UA Cloudy     pH, UA 6.0     Specific Gravity, UA 1.032     Glucose, UA Negative     Ketones, UA 40 mg/dL (2+)     Bilirubin, UA Small (1+)     Comment: Confirmation testing is unavailable.  A serum bilirubin is recommended for further assessment.        Blood, UA Large (3+)     Protein, UA 30 mg/dL (1+)     Leuk Esterase, UA Trace     Nitrite, UA Negative     Urobilinogen, UA 1.0 E.U./dL    Basic Metabolic Panel [[id number]]  (Abnormal) Collected: 07/13/25 1424    Specimen: Blood Updated: 07/13/25 1521     Glucose 116 mg/dL      BUN 14.5 mg/dL      Creatinine 0.65 mg/dL      Sodium 137 mmol/L      Potassium 4.2 mmol/L      Comment: Specimen hemolyzed.  Result may be falsely elevated.        Chloride 99 mmol/L       CO2 22.2 mmol/L      Calcium 9.8 mg/dL      BUN/Creatinine Ratio 22.3     Anion Gap 15.8 mmol/L      eGFR 127.8 mL/min/1.73     Narrative:      GFR Categories in Chronic Kidney Disease (CKD)              GFR Category          GFR (mL/min/1.73)    Interpretation  G1                    90 or greater        Normal or high (1)  G2                    60-89                Mild decrease (1)  G3a                   45-59                Mild to moderate decrease  G3b                   30-44                Moderate to severe decrease  G4                    15-29                Severe decrease  G5                    14 or less           Kidney failure    (1)In the absence of evidence of kidney disease, neither GFR category G1 or G2 fulfill the criteria for CKD.    eGFR calculation 2021 CKD-EPI creatinine equation, which does not include race as a factor    Extra Tubes [[id number]] Collected: 07/13/25 1424    Specimen: Blood, Venous Line Updated: 07/13/25 1445    Narrative:      The following orders were created for panel order Extra Tubes.  Procedure                               Abnormality         Status                     ---------                               -----------         ------                     Gold Top - SST[[id number]]                                   Final result               Light Blue Top[[id number]]                                   Final result                 Please view results for these tests on the individual orders.    Gold Top - SST [[id number]] Collected: 07/13/25 1424    Specimen: Blood Updated: 07/13/25 1445     Extra Tube Hold for add-ons.     Comment: Auto resulted.       Light Blue Top [[id number]] Collected: 07/13/25 1424    Specimen: Blood Updated: 07/13/25 1445     Extra Tube Hold for add-ons.     Comment: Auto resulted       CBC & Differential [[id number]]  (Abnormal) Collected: 07/13/25 1424    Specimen: Blood Updated: 07/13/25 1432    Narrative:      The following orders were created  for panel order CBC & Differential.  Procedure                               Abnormality         Status                     ---------                               -----------         ------                     CBC Auto Differential[[id number]]        Abnormal            Final result                 Please view results for these tests on the individual orders.    CBC Auto Differential [[id number]]  (Abnormal) Collected: 07/13/25 1424    Specimen: Blood Updated: 07/13/25 1432     WBC 13.27 10*3/mm3      RBC 5.73 10*6/mm3      Hemoglobin 13.7 g/dL      Hematocrit 45.4 %      MCV 79.2 fL      MCH 23.9 pg      MCHC 30.2 g/dL      RDW 14.6 %      RDW-SD 41.9 fl      MPV 9.4 fL      Platelets 583 10*3/mm3      Neutrophil % 73.2 %      Lymphocyte % 16.7 %      Monocyte % 8.5 %      Eosinophil % 0.0 %      Basophil % 0.5 %      Immature Grans % 1.1 %      Neutrophils, Absolute 9.70 10*3/mm3      Lymphocytes, Absolute 2.22 10*3/mm3      Monocytes, Absolute 1.13 10*3/mm3      Eosinophils, Absolute 0.00 10*3/mm3      Basophils, Absolute 0.07 10*3/mm3      Immature Grans, Absolute 0.15 10*3/mm3      nRBC 0.0 /100 WBC             Imaging Results (Most Recent)       None          reviewed    ECG/EMG Results (most recent)       None          reviewed        No results found for this or any previous visit.      Microbiology Results (last 10 days)       ** No results found for the last 240 hours. **            Assessment & Plan     Vomiting    Mesenteric adenitis    Elevated blood pressure reading        -Outpatient CT shows mesenteric adenitis  - Improvement of anion gap from 15-11 with IV fluids  - Compazine scopolamine for nausea vomiting  - Advancing diet    Asthma  - Not in exacerbation    Obesity  - BMI over 42  - Lifestyle recommendations    Elevated blood pressure  - Was given low-dose Norvasc with improvement  - Instructed to monitor blood pressure at home and was given a short course of Norvasc    I discussed the patients  findings and my recommendations with patient and mother.     Discharge Diagnosis:      Vomiting    Mesenteric adenitis    Elevated blood pressure reading      Hospital Course  Patient is a 20 y.o. female presented with nausea vomiting abdominal pain.  She had a CAT scan at an outlying facility showing mesenteric adenitis and failed p.o. challenge in the ER was placed in observation for hydration.  Repeat labs in the morning seem much improved.  Continued with antiemetics.  Was seen by PT for right hip pain currently asymptomatic on reexam.  With scopolamine and Compazine she was able to tolerate p.o. fluids and on reexam states she is feeling better and is ready to go home.  She is already established with a family doctor.  We discussed the importance of follow-up as well as keeping a blood pressure diary and when to take her amlodipine.    Past Medical History:     Past Medical History:   Diagnosis Date    Allergic     Allergic rhinitis due to pollen     Asthma, moderate persistent        Past Surgical History:   History reviewed. No pertinent surgical history.    Social History:   Social History     Socioeconomic History    Marital status: Single   Tobacco Use    Smoking status: Never    Smokeless tobacco: Never   Vaping Use    Vaping status: Never Used   Substance and Sexual Activity    Alcohol use: No    Drug use: Never    Sexual activity: Defer       Procedures Performed         Consults:   Consults       No orders found for last 30 day(s).            Condition on Discharge:     Stable    Discharge Disposition  Home or Self Care    Discharge Medications     Discharge Medications        New Medications        Instructions Start Date   amLODIPine 2.5 MG tablet  Commonly known as: Norvasc   2.5 mg, Oral, Daily      prochlorperazine 5 MG tablet  Commonly known as: COMPAZINE   5 mg, Oral, Every 6 Hours PRN      Scopolamine 1 MG/3DAYS patch   1 patch, Transdermal, Every 72 Hours             Continue These Medications         Instructions Start Date   cetirizine 10 MG tablet  Commonly known as: zyrTEC   10 mg, Oral, Daily      ondansetron ODT 4 MG disintegrating tablet  Commonly known as: ZOFRAN-ODT   4 mg, 4 Times Daily               Discharge Diet:     Activity at Discharge:     Follow-up Appointments  No future appointments.      Test Results Pending at Discharge       Risk for Readmission (LACE) Score: 3 (7/14/2025  6:00 AM)          ABRIL Kothari  07/14/25  13:29 EDT

## 2025-07-14 NOTE — THERAPY EVALUATION
Patient Name: Minerva Cuevas  : 2005    MRN: [id number]                              Today's Date: 2025       Admit Date: 2025    Visit Dx:     ICD-10-CM ICD-9-CM   1. Nausea and vomiting, unspecified vomiting type  R11.2 787.01   2. Right lower quadrant abdominal pain  R10.31 789.03     Patient Active Problem List   Diagnosis    Asthma, moderate persistent    Obesity    Seasonal allergic rhinitis due to pollen    Cellulitis of left foot    Antibiotic-induced yeast infection    Acute non-recurrent maxillary sinusitis    Cough    Nausea and vomiting    Bug bite    Acute bronchitis    Suspected COVID-19 virus infection    Otalgia of both ears    Cat bite    Seasonal allergic rhinitis    Non-recurrent acute suppurative otitis media of right ear without spontaneous rupture of tympanic membrane    Congenital reduction deformity of lower limb    Seasonal allergic rhinitis    Earache on left    Arthralgia of left temporomandibular joint    Vomiting    Mesenteric adenitis    Elevated blood pressure reading     Past Medical History:   Diagnosis Date    Allergic     Allergic rhinitis due to pollen     Asthma, moderate persistent      History reviewed. No pertinent surgical history.   General Information       Row Name 25 1314          Physical Therapy Time and Intention    Document Type evaluation  -     Mode of Treatment physical therapy  -       Row Name 25 1314          General Information    Patient Profile Reviewed yes  -     Prior Level of Function --  I with functional mobility/ ADLs at OF without AD. Pt was I in community and was driving.  -     Existing Precautions/Restrictions no known precautions/restrictions  -       Row Name 25 1314          Home Main Entrance    Number of Stairs, Main Entrance --  Pt was living at home with family with no OHOD. Bedroom is upstairs (13 steps with HR). Support available from family upon d/c.  -       Row Name 25 1314           Cognition    Orientation Status (Cognition) oriented x 4  -RM       Row Name 07/14/25 1314          Safety Issues/Impairments Affecting Functional Mobility    Impairments Affecting Function (Mobility) endurance/activity tolerance  -RM               User Key  (r) = Recorded By, (t) = Taken By, (c) = Cosigned By      Initials Name Provider Type    Leslie Schafer PT Physical Therapist                   Mobility       Row Name 07/14/25 1316          Bed Mobility    Bed Mobility bed mobility (all) activities  -RM     All Activities, [place] (Bed Mobility) independent  -RM       Row Name 07/14/25 1316          Sit-Stand Transfer    Sit-Stand [place] (Transfers) independent  -RM     Comment, (Sit-Stand Transfer) Without AD; no safety concerns observed  -RM       Row Name 07/14/25 1316          Gait/Stairs (Locomotion)    [place] Level (Gait) independent  -RM     Distance in Feet (Gait) 150  -RM     Deviations/Abnormal Patterns (Gait) base of support, narrow  -RM     Number of Steps (Stairs) x 9 steps with HR simulating home environment independently with no safety concerns observed; declines need to further address stair training reporting no concern upon d/c  -RM     Comment, (Gait/Stairs) Without AD; reports fatigued d/t limited eating/drinking, however no safety concerns observed  -RM               User Key  (r) = Recorded By, (t) = Taken By, (c) = Cosigned By      Initials Name Provider Type    Leslie Schafer PT Physical Therapist                   Obj/Interventions       Row Name 07/14/25 1318          Range of Motion Comprehensive    General Range of Motion bilateral lower extremity ROM WFL  -RM       Row Name 07/14/25 1318          Strength Comprehensive (MMT)    General Manual Muscle Testing (MMT) Assessment no strength deficits identified  -RM       Row Name 07/14/25 1318          Balance    Comment, Balance No balance deficits observed  -RM               User Key  (r) = Recorded By, (t) =  Taken By, (c) = Cosigned By      Initials Name Provider Type    RM Leslie Spence, PT Physical Therapist                   Goals/Plan    No documentation.                  Clinical Impression       Row Name 07/14/25 1318          Pain    Pretreatment Pain Rating 0/10 - no pain  -RM     Posttreatment Pain Rating 0/10 - no pain  -RM     Pre/Posttreatment Pain Comment Pt reports h/o intermittent pain of R groin region, however unable to reproduce reporting no pain at rest/during activity this date  -       Row Name 07/14/25 1318          Plan of Care Review    Plan of Care Reviewed With patient;parent  -     Outcome Evaluation 19 y/o F with h/o generalized abdominal pain, n/v, diarrhea seen at outlying facility on two similar occasions without resolution of symptoms. Pt was seen at [place] ED for this and dx with inflammation to abdominal lymph nodes and n/v. At baseline, pt lives at home with family and is I with functional mobility/ ADLs without AD, was I in community, and was driving. Pt/mother and nursing agreeable to PT eval this date. Vitals WNL. Pt at I level with bed mobility, functional transfers, gait training x 150 ft without AD, and stair training with no safety concerns observed. PT consulted for R hip pain, however pt unable to reproduce during PT session reporting no pain at rest/during activity this date with no impact on functional performance. PT d/c recommendation of returning home with support from family as needed. Pt reports has been up ad asa within room and is at functional baseline with no further skilled PT services warranted. PT signing off.  -       Row Name 07/14/25 1318          Therapy Assessment/Plan (PT)    Criteria for Skilled Interventions Met (PT) no;no problems identified which require skilled intervention  -     Therapy Frequency (PT) evaluation only  -RM       Row Name 07/14/25 1318          Positioning and Restraints    Pre-Treatment Position in bed  -RM     Post  Treatment Position bed  -RM     In Bed call light within reach;notified nsg  -RM               User Key  (r) = Recorded By, (t) = Taken By, (c) = Cosigned By      Initials Name Provider Type     Leslie Spence, CARIDAD Physical Therapist                   Outcome Measures       Row Name 07/14/25 1325 07/14/25 0800       How much help from another person do you currently need...    Turning from your back to your side while in flat bed without using bedrails? 4  -RM 4  -MR    Moving from lying on back to sitting on the side of a flat bed without bedrails? 4  -RM 4  -MR    Moving to and from a bed to a chair (including a wheelchair)? 4  -RM 4  -MR    Standing up from a chair using your arms (e.g., wheelchair, bedside chair)? 4  -RM 4  -MR    Climbing 3-5 steps with a railing? 4  -RM 4  -MR    To walk in hospital room? 4  -RM 4  -MR    AM-PAC 6 Clicks Score (PT) 24  -RM 24  -MR    Highest Level of Mobility Goal Walk 250 Feet or More - 8  -RM Walk 250 Feet or More - 8  -MR      Row Name 07/14/25 1325          Functional Assessment    Outcome Measure Options AM-PAC 6 Clicks Basic Mobility (PT)  -RM               User Key  (r) = Recorded By, (t) = Taken By, (c) = Cosigned By      Initials Name Provider Type     FrancoisTemitope, RN Registered Nurse     Leslie Spence, CARIDAD Physical Therapist                                 Physical Therapy Education       Title: PT OT SLP Therapies (Resolved)       Topic: Physical Therapy (Resolved)       Point: Mobility training (Resolved)       Learning Progress Summary            Patient Acceptance, E, KESHAV,DU by  at 7/14/2025 1325                                      User Key       Initials Effective Dates Name Provider Type Discipline     03/27/23 -  Leslie Spence PT Physical Therapist PT                  PT Recommendation and Plan     Outcome Evaluation: 19 y/o F with h/o generalized abdominal pain, n/v, diarrhea seen at [facility] on two similar occasions without  resolution of symptoms. Pt was seen at [place] ED for this and dx with inflammation to abdominal lymph nodes and n/v. At baseline, pt lives at home with family and is I with functional mobility/ ADLs without AD, was I in community, and was driving. Pt/mother and nursing agreeable to PT eval this date. Vitals WNL. Pt at I level with bed mobility, functional transfers, gait training x 150 ft without AD, and stair training with no safety concerns observed. PT consulted for R hip pain, however pt unable to reproduce during PT session reporting no pain at rest/during activity this date with no impact on functional performance. PT d/c recommendation of returning home with support from family as needed. Pt reports has been up ad asa within room and is at functional baseline with no further skilled PT services warranted. PT signing off.     Time Calculation:         PT Charges       Row Name 07/14/25 1326             Time Calculation    Start Time 1259  -RM      Stop Time 1309  -RM      Time Calculation (min) 10 min  -RM      PT Received On 07/14/25  -RM         Time Calculation- PT    Total Timed Code Minutes- PT 0 minute(s)  -RM                User Key  (r) = Recorded By, (t) = Taken By, (c) = Cosigned By      Initials Name Provider Type    Leslie Schafer, PT Physical Therapist                  Therapy Charges for Today       Code Description Service Date Service Provider Modifiers Qty    [id number] HC PT EVAL LOW COMPLEXITY 4 7/14/2025 Leslie Spence, PT GP 1            PT G-Codes  Outcome Measure Options: AM-PAC 6 Clicks Basic Mobility (PT)  AM-PAC 6 Clicks Score (PT): 24  PT Discharge Summary  Anticipated Discharge Disposition (PT): home    Leslie Spence PT  7/14/2025

## 2025-07-14 NOTE — DISCHARGE INSTRUCTIONS
Follow-up with your family doctor.  Keep a blood pressure diary.  Return to the ER for any new or worsening symptoms  Clear liquids for the next 24 hours.  Gradually increase your diet as tolerated.  No milk products for 48 hours.  If more than 8-10 episodes of diarrhea per day use over-the-counter Imodium.  Follow up with your family doctor next week.  Return for any new or worsening symptoms